# Patient Record
Sex: FEMALE | Race: WHITE | Employment: OTHER | URBAN - METROPOLITAN AREA
[De-identification: names, ages, dates, MRNs, and addresses within clinical notes are randomized per-mention and may not be internally consistent; named-entity substitution may affect disease eponyms.]

---

## 2019-05-26 ENCOUNTER — HOSPITAL ENCOUNTER (EMERGENCY)
Age: 69
Discharge: HOME | End: 2019-05-26
Attending: STUDENT IN AN ORGANIZED HEALTH CARE EDUCATION/TRAINING PROGRAM
Payer: MEDICARE

## 2019-05-26 ENCOUNTER — APPOINTMENT (OUTPATIENT)
Dept: GENERAL RADIOLOGY | Age: 69
End: 2019-05-26
Attending: STUDENT IN AN ORGANIZED HEALTH CARE EDUCATION/TRAINING PROGRAM
Payer: MEDICARE

## 2019-05-26 VITALS
OXYGEN SATURATION: 98 % | TEMPERATURE: 98 F | WEIGHT: 145 LBS | BODY MASS INDEX: 22.76 KG/M2 | HEIGHT: 67 IN | DIASTOLIC BLOOD PRESSURE: 62 MMHG | RESPIRATION RATE: 16 BRPM | HEART RATE: 72 BPM | SYSTOLIC BLOOD PRESSURE: 128 MMHG

## 2019-05-26 DIAGNOSIS — S52.502A CLOSED FRACTURE OF DISTAL END OF LEFT RADIUS, UNSPECIFIED FRACTURE MORPHOLOGY, INITIAL ENCOUNTER: Primary | ICD-10-CM

## 2019-05-26 PROCEDURE — 90715 TDAP VACCINE 7 YRS/> IM: CPT | Performed by: STUDENT IN AN ORGANIZED HEALTH CARE EDUCATION/TRAINING PROGRAM

## 2019-05-26 PROCEDURE — 77030019945 HC PDNG CST 3M -A

## 2019-05-26 PROCEDURE — 99283 EMERGENCY DEPT VISIT LOW MDM: CPT

## 2019-05-26 PROCEDURE — 74011250636 HC RX REV CODE- 250/636: Performed by: STUDENT IN AN ORGANIZED HEALTH CARE EDUCATION/TRAINING PROGRAM

## 2019-05-26 PROCEDURE — 73090 X-RAY EXAM OF FOREARM: CPT

## 2019-05-26 PROCEDURE — 75810000053 HC SPLINT APPLICATION

## 2019-05-26 PROCEDURE — 90471 IMMUNIZATION ADMIN: CPT

## 2019-05-26 RX ORDER — LIDOCAINE HYDROCHLORIDE 20 MG/ML
1 INJECTION, SOLUTION EPIDURAL; INFILTRATION; INTRACAUDAL; PERINEURAL ONCE
Status: DISCONTINUED | OUTPATIENT
Start: 2019-05-26 | End: 2019-05-26 | Stop reason: HOSPADM

## 2019-05-26 RX ADMIN — TETANUS TOXOID, REDUCED DIPHTHERIA TOXOID AND ACELLULAR PERTUSSIS VACCINE, ADSORBED 0.5 ML: 5; 2.5; 8; 8; 2.5 SUSPENSION INTRAMUSCULAR at 13:09

## 2019-05-26 NOTE — ED PROVIDER NOTES
68 y.o. female with past medical history significant for asthma who [place] via private vehicle with chief complaint of left arm deformity. Pt reports slipping on an acorn on the tennis court approximately one hour ago. Pt reports she fell straight backwards and landed on her bottom. Pt reports trying to brace her fall using her arms, and noticed her left arm was deformed after the fall. Pt is complaining of left arm numbness since the fall, but states she is able to move all her fingers. Pt states she did not hit her head and did not lose consciousness. Pt reports she was seen by EMS on the scene and they splinted her arm. Pt reports she was driven here by family afterwards. Pt states she is unsure of when her last tetanus shot was. Pt denies being on anticoagulants. There are no other acute medical concerns at this time. Past surgical hx -     Note written by Marcos Severs, Scribe, as dictated by Ja Levine MD 12:44 PM.               Past Medical History:   Diagnosis Date    Asthma        Past Surgical History:   Procedure Laterality Date    HX  SECTION      x 1         No family history on file.     Social History     Socioeconomic History    Marital status: Not on file     Spouse name: Not on file    Number of children: Not on file    Years of education: Not on file    Highest education level: Not on file   Occupational History    Not on file   Social Needs    Financial resource strain: Not on file    Food insecurity:     Worry: Not on file     Inability: Not on file    Transportation needs:     Medical: Not on file     Non-medical: Not on file   Tobacco Use    Smoking status: Not on file   Substance and Sexual Activity    Alcohol use: Not on file    Drug use: Not on file    Sexual activity: Not on file   Lifestyle    Physical activity:     Days per week: Not on file     Minutes per session: Not on file    Stress: Not on file   Relationships    Social connections:     Talks on phone: Not on file     Gets together: Not on file     Attends [employer] service: Not on file     Active member of club or organization: Not on file     Attends meetings of clubs or organizations: Not on file     Relationship status: Not on file    Intimate partner violence:     Fear of current or ex partner: Not on file     Emotionally abused: Not on file     Physically abused: Not on file     Forced sexual activity: Not on file   Other Topics Concern    Not on file   Social History Narrative    Not on file         ALLERGIES: Patient has no known allergies. Review of Systems   Constitutional: Negative for chills and fever. Eyes: Negative for photophobia. Respiratory: Negative for shortness of breath. Cardiovascular: Negative for chest pain. Gastrointestinal: Negative for abdominal pain. Genitourinary: Negative for dysuria. Musculoskeletal: Negative for back pain.        + Arm deformity   Neurological: Positive for numbness. Negative for syncope and headaches. Psychiatric/Behavioral: Negative for confusion. All other systems reviewed and are negative. Vitals:    05/26/19 1251   BP: 135/56   Pulse: 66   Resp: 16   Temp: 97.7 °F (36.5 °C)   SpO2: 96%   Weight: 65.8 kg (145 lb)   Height: 5' 7\" (1.702 m)            Physical Exam   Constitutional: She is oriented to person, place, and time. She appears well-nourished. No distress. HENT:   Head: Normocephalic and atraumatic. Eyes: Pupils are equal, round, and reactive to light. EOM are normal.   Cardiovascular: Normal rate, regular rhythm and intact distal pulses. Pulmonary/Chest: Effort normal and breath sounds normal. She exhibits no tenderness. Abdominal: She exhibits no distension. There is no tenderness. Musculoskeletal: She exhibits deformity. Pelvis nontender. Deformity of left distal forearm, NVI. Left knee with FROM. Entire C-spine palpated and nontender.     Neurological: She is alert and oriented to person, place, and time. Skin: Skin is warm. Abrasion of the left knee. Psychiatric: Her behavior is normal.   Nursing note and vitals reviewed. Note written by Chai Bryan, as dictated by Mahi Benjamin MD 12:44 PM.  MDM  Number of Diagnoses or Management Options  Closed fracture of distal end of left radius, unspecified fracture morphology, initial encounter:          Procedures  CONSULT NOTE:  1:21 PM Mahi Benjamin MD communicated with Dr. Emeka Guan, Consult for Orthopedics via [facility] Text. Discussed available diagnostic tests and clinical findings. Dr. Emeka Guan does not recommend reducing at this time and will evaluate the pt in the office on Tuesday 5/28/19 as the injury will likely require surgery. The patient has minimal pain while in splint. Multiple checks in the ED confirm that she has normal perfusion and sensation to light touch throughout the hand distal to the fracture site. Plan to immobilize in a sugar tong splint. PROGRESS NOTE:  2:27 PM  Pain is minimal and she will follow up with ortho on 5/28/19. Placing sugar tong splint and sling.     2:28 PM  Patient's results have been reviewed with them. Patient and/or family have verbally conveyed their understanding and agreement of the patient's signs, symptoms, diagnosis, treatment and prognosis and additionally agree to follow up as recommended or return to the Emergency Room should their condition change prior to follow-up. Discharge instructions have also been provided to the patient with some educational information regarding their diagnosis as well a list of reasons why they would want to return to the ER prior to their follow-up appointment should their condition change.

## 2019-05-26 NOTE — ED TRIAGE NOTES
Pt reports slipping on an acorn on the tennis court apoorx 1 hour ago. Pt states she fell backwards. Denies hitting head or having LOC. Pt not on blood thinners. Pt was splinted by EMS but was driven here by family.

## 2019-05-26 NOTE — CONSULTS
ORTHO:    Telephone consult with Dr Dennise Voss who describes a 77yo female with a [address] injury. NVI per ER with imaging showing a dorsally displaced distal radius fracture. Recommend splinting in place as the fracture will require surgical intervention and be somewhat unstable even with closed reduction. Follow up with Dr. Sindy Dunham on Tuesday for further management, call tomorrow for appointment. Thank you for allowing us to take part in this patients care.     Dr Sravan Lim has been personally involved in the current plan of care    Armando Lawson PA-C  Orthopedic Trauma Service  [address] [facility] ([place], [place])

## 2019-05-26 NOTE — ED NOTES
I have reviewed discharge instructions with the patient. The patient verbalized understanding. Time allotted for questions. VSS. Pt ambulatory out of unit with son.